# Patient Record
(demographics unavailable — no encounter records)

---

## 2025-01-28 NOTE — PHYSICAL EXAM

## 2025-01-29 NOTE — DEVELOPMENTAL MILESTONES
[Normal Development] : Normal Development [Yes] : Completed. [None] : none [Spreads with a knife] : spreads with a knife [Dresses and undresses without help] : dresses and undresses without help [Goes to the bathroom independently] : goes to bathroom independently [Is dry through the day] :  is dry through the day [Plays and interacts with peer] : plays and interacts with peer [Answers "why" questions] : answers "why" questions [Tells a story of 2 sentences or more] : tells a story of 2 sentences or more [Follows directions for 4 individual] : follows directions for 4 individual prepositions [Counts 5 objects] : counts 5 objects [Names 3 or more numbers] : names 3 or more numbers [Names 4 or more letters out of order] : names 4 or more letters out of order [Is beginning to skip] : is beginning to skip [Walks on tiptoes when asked] : walks on tiptoes when asked [Catches a bounced ball with] : catches a bounced ball with 2 hands [Copies a triangle] : copies a triangle [Draws a 6-part person] : draws a 6-part person [Copies first name] : copies first name [Cuts well with scissors] : cuts well with scissors [Writes 2 or more letters] : writes 2 or more letters

## 2025-01-29 NOTE — HISTORY OF PRESENT ILLNESS
[Normal] : Normal [Water heater temperature set at <120 degrees F] : Water heater temperature set at <120 degrees F [Car seat in back seat] : Car seat in back seat [Carbon Monoxide Detectors] : Carbon monoxide detectors [Smoke Detectors] : Smoke detectors [Supervised outdoor play] : Supervised outdoor play [NO] : No [Mother] : mother [Fruit] : fruit [Vegetables] : vegetables [Meat] : meat [Grains] : grains [Eggs] : eggs [Vitamin] : Patient takes vitamin daily [___ stools per day] : [unfilled]  stools per day [___ voids per day] : [unfilled] voids per day [Toilet Trained] :  toilet trained [In own bed] : In own bed [Brushing teeth] : Brushing teeth [Yes] : Patient goes to dentist yearly [Toothpaste] : Primary Fluoride Source: Toothpaste [Playtime (60 min/d)] : Playtime 60 min a day [Appropiate parent-child-sibling interaction] : Appropriate parent-child-sibling interaction [Parent has appropriate responses to behavior] : Parent has appropriate responses to behavior [In Pre-K] : In Pre-K [Adequate performance] : Adequate performance [Adequate attention] : Adequate attention [No difficulties with Homework] : No difficulties with homework  [No] : Not at  exposure [Up to date] : Up to date [< 2 hrs of screen time] : Less than 2 hrs of screen time [Child Cooperates] : Child cooperates [Exposure to electronic nicotine delivery system] : No exposure to electronic nicotine delivery system [[id number]] : No hospitalization/Surgeries, Specialist visit. [de-identified] : None

## 2025-01-29 NOTE — HISTORY OF PRESENT ILLNESS
[Normal] : Normal [Water heater temperature set at <120 degrees F] : Water heater temperature set at <120 degrees F [Car seat in back seat] : Car seat in back seat [Carbon Monoxide Detectors] : Carbon monoxide detectors [Smoke Detectors] : Smoke detectors [Supervised outdoor play] : Supervised outdoor play [NO] : No [Mother] : mother [Fruit] : fruit [Vegetables] : vegetables [Meat] : meat [Grains] : grains [Eggs] : eggs [Vitamin] : Patient takes vitamin daily [___ stools per day] : [unfilled]  stools per day [___ voids per day] : [unfilled] voids per day [Toilet Trained] :  toilet trained [In own bed] : In own bed [Brushing teeth] : Brushing teeth [Yes] : Patient goes to dentist yearly [Toothpaste] : Primary Fluoride Source: Toothpaste [Playtime (60 min/d)] : Playtime 60 min a day [Appropiate parent-child-sibling interaction] : Appropriate parent-child-sibling interaction [Parent has appropriate responses to behavior] : Parent has appropriate responses to behavior [In Pre-K] : In Pre-K [Adequate performance] : Adequate performance [Adequate attention] : Adequate attention [No difficulties with Homework] : No difficulties with homework  [No] : Not at  exposure [Up to date] : Up to date [< 2 hrs of screen time] : Less than 2 hrs of screen time [Child Cooperates] : Child cooperates [Exposure to electronic nicotine delivery system] : No exposure to electronic nicotine delivery system [[id number]] : No hospitalization/Surgeries, Specialist visit. [de-identified] : None

## 2025-01-29 NOTE — DISCUSSION/SUMMARY
[Normal Growth] : growth [Normal Development] : development  [No Elimination Concerns] : elimination [Continue Regimen] : feeding [No Skin Concerns] : skin [Normal Sleep Pattern] : sleep [None] : no medical problems [School Readiness] : school readiness [Mental Health] : mental health [Nutrition and Physical Activity] : nutrition and physical activity [Oral Health] : oral health [Safety] : safety [Anticipatory Guidance Given] : Anticipatory guidance addressed as per the history of present illness section [DTaP] : diptheria, tetanus and pertussis [IPV] : inactivated poliovirus [No Medications] : ~He/She~ is not on any medications [Excessive Weight Gain] : excessive weight gain [BMI ___] : body mass index of [unfilled] [] : The components of the vaccine(s) to be administered today are listed in the plan of care. The disease(s) for which the vaccine(s) are intended to prevent and the risks have been discussed with the caretaker.  The risks are also included in the appropriate vaccination information statements which have been provided to the patient's caregiver.  The caregiver has given consent to vaccinate.